# Patient Record
Sex: FEMALE | Race: WHITE | NOT HISPANIC OR LATINO | Employment: FULL TIME | URBAN - NONMETROPOLITAN AREA
[De-identification: names, ages, dates, MRNs, and addresses within clinical notes are randomized per-mention and may not be internally consistent; named-entity substitution may affect disease eponyms.]

---

## 2017-11-28 PROBLEM — K92.1 MELENA: Status: ACTIVE | Noted: 2017-11-28

## 2022-06-10 ENCOUNTER — HOSPITAL ENCOUNTER (EMERGENCY)
Facility: HOSPITAL | Age: 72
Discharge: HOME | End: 2022-06-10
Attending: STUDENT IN AN ORGANIZED HEALTH CARE EDUCATION/TRAINING PROGRAM
Payer: MEDICARE

## 2022-06-10 VITALS
OXYGEN SATURATION: 96 % | SYSTOLIC BLOOD PRESSURE: 163 MMHG | RESPIRATION RATE: 18 BRPM | DIASTOLIC BLOOD PRESSURE: 86 MMHG | BODY MASS INDEX: 29.16 KG/M2 | HEIGHT: 65 IN | TEMPERATURE: 98 F | WEIGHT: 175 LBS | HEART RATE: 63 BPM

## 2022-06-10 DIAGNOSIS — K59.00 CONSTIPATION, UNSPECIFIED CONSTIPATION TYPE: ICD-10-CM

## 2022-06-10 DIAGNOSIS — K52.9 COLITIS: Primary | ICD-10-CM

## 2022-06-10 LAB
ALBUMIN SERPL BCP-MCNC: 3.2 G/DL (ref 3.5–5.2)
ALP SERPL-CCNC: 51 U/L (ref 55–135)
ALT SERPL W/O P-5'-P-CCNC: 36 U/L (ref 10–44)
ANION GAP SERPL CALC-SCNC: 5 MMOL/L (ref 8–16)
APTT BLDCRRT: 21.1 SEC (ref 21–32)
AST SERPL-CCNC: 21 U/L (ref 10–40)
BASOPHILS # BLD AUTO: 0.05 K/UL (ref 0–0.2)
BASOPHILS NFR BLD: 0.4 % (ref 0–1.9)
BILIRUB SERPL-MCNC: 0.5 MG/DL (ref 0.1–1)
BUN SERPL-MCNC: 16 MG/DL (ref 8–23)
CALCIUM SERPL-MCNC: 8.8 MG/DL (ref 8.7–10.5)
CHLORIDE SERPL-SCNC: 105 MMOL/L (ref 95–110)
CO2 SERPL-SCNC: 29 MMOL/L (ref 23–29)
CREAT SERPL-MCNC: 1 MG/DL (ref 0.5–1.4)
DIFFERENTIAL METHOD: ABNORMAL
EOSINOPHIL # BLD AUTO: 0.2 K/UL (ref 0–0.5)
EOSINOPHIL NFR BLD: 1.7 % (ref 0–8)
ERYTHROCYTE [DISTWIDTH] IN BLOOD BY AUTOMATED COUNT: 12.1 % (ref 11.5–14.5)
EST. GFR  (AFRICAN AMERICAN): >60 ML/MIN/1.73 M^2
EST. GFR  (NON AFRICAN AMERICAN): 55.6 ML/MIN/1.73 M^2
GLUCOSE SERPL-MCNC: 120 MG/DL (ref 70–110)
HCT VFR BLD AUTO: 44.5 % (ref 37–48.5)
HGB BLD-MCNC: 15.5 G/DL (ref 12–16)
IMM GRANULOCYTES # BLD AUTO: 0.12 K/UL (ref 0–0.04)
IMM GRANULOCYTES NFR BLD AUTO: 1 % (ref 0–0.5)
INR PPP: 0.9 (ref 0.8–1.2)
LIPASE SERPL-CCNC: 94 U/L (ref 23–300)
LYMPHOCYTES # BLD AUTO: 3.4 K/UL (ref 1–4.8)
LYMPHOCYTES NFR BLD: 28.2 % (ref 18–48)
MCH RBC QN AUTO: 30.6 PG (ref 27–31)
MCHC RBC AUTO-ENTMCNC: 34.8 G/DL (ref 32–36)
MCV RBC AUTO: 88 FL (ref 82–98)
MONOCYTES # BLD AUTO: 1 K/UL (ref 0.3–1)
MONOCYTES NFR BLD: 8.4 % (ref 4–15)
NEUTROPHILS # BLD AUTO: 7.3 K/UL (ref 1.8–7.7)
NEUTROPHILS NFR BLD: 60.3 % (ref 38–73)
NRBC BLD-RTO: 0 /100 WBC
PLATELET # BLD AUTO: 263 K/UL (ref 150–450)
PMV BLD AUTO: 9.3 FL (ref 9.2–12.9)
POTASSIUM SERPL-SCNC: 3.6 MMOL/L (ref 3.5–5.1)
PROT SERPL-MCNC: 6.4 G/DL (ref 6–8.4)
PROTHROMBIN TIME: 9.9 SEC (ref 9–12.5)
RBC # BLD AUTO: 5.06 M/UL (ref 4–5.4)
SODIUM SERPL-SCNC: 139 MMOL/L (ref 136–145)
WBC # BLD AUTO: 12.06 K/UL (ref 3.9–12.7)

## 2022-06-10 PROCEDURE — 85610 PROTHROMBIN TIME: CPT | Performed by: STUDENT IN AN ORGANIZED HEALTH CARE EDUCATION/TRAINING PROGRAM

## 2022-06-10 PROCEDURE — 83690 ASSAY OF LIPASE: CPT | Performed by: STUDENT IN AN ORGANIZED HEALTH CARE EDUCATION/TRAINING PROGRAM

## 2022-06-10 PROCEDURE — 36415 COLL VENOUS BLD VENIPUNCTURE: CPT | Performed by: STUDENT IN AN ORGANIZED HEALTH CARE EDUCATION/TRAINING PROGRAM

## 2022-06-10 PROCEDURE — 85730 THROMBOPLASTIN TIME PARTIAL: CPT | Performed by: STUDENT IN AN ORGANIZED HEALTH CARE EDUCATION/TRAINING PROGRAM

## 2022-06-10 PROCEDURE — 85025 COMPLETE CBC W/AUTO DIFF WBC: CPT | Performed by: STUDENT IN AN ORGANIZED HEALTH CARE EDUCATION/TRAINING PROGRAM

## 2022-06-10 PROCEDURE — 96374 THER/PROPH/DIAG INJ IV PUSH: CPT

## 2022-06-10 PROCEDURE — 99285 EMERGENCY DEPT VISIT HI MDM: CPT | Mod: 25

## 2022-06-10 PROCEDURE — 63600175 PHARM REV CODE 636 W HCPCS: Performed by: STUDENT IN AN ORGANIZED HEALTH CARE EDUCATION/TRAINING PROGRAM

## 2022-06-10 PROCEDURE — 80053 COMPREHEN METABOLIC PANEL: CPT | Performed by: STUDENT IN AN ORGANIZED HEALTH CARE EDUCATION/TRAINING PROGRAM

## 2022-06-10 PROCEDURE — 25500020 PHARM REV CODE 255: Performed by: STUDENT IN AN ORGANIZED HEALTH CARE EDUCATION/TRAINING PROGRAM

## 2022-06-10 RX ORDER — AMOXICILLIN AND CLAVULANATE POTASSIUM 875; 125 MG/1; MG/1
1 TABLET, FILM COATED ORAL 2 TIMES DAILY
Qty: 14 TABLET | Refills: 0 | Status: SHIPPED | OUTPATIENT
Start: 2022-06-10 | End: 2022-07-21

## 2022-06-10 RX ORDER — KETOROLAC TROMETHAMINE 30 MG/ML
15 INJECTION, SOLUTION INTRAMUSCULAR; INTRAVENOUS
Status: COMPLETED | OUTPATIENT
Start: 2022-06-10 | End: 2022-06-10

## 2022-06-10 RX ORDER — POLYETHYLENE GLYCOL 3350 17 G/17G
17 POWDER, FOR SOLUTION ORAL DAILY
Qty: 116 G | Refills: 0 | Status: SHIPPED | OUTPATIENT
Start: 2022-06-10 | End: 2022-07-21

## 2022-06-10 RX ADMIN — KETOROLAC TROMETHAMINE 15 MG: 30 INJECTION, SOLUTION INTRAMUSCULAR at 05:06

## 2022-06-10 RX ADMIN — IOHEXOL 100 ML: 350 INJECTION, SOLUTION INTRAVENOUS at 05:06

## 2022-06-10 NOTE — ED PROVIDER NOTES
"Encounter Date: 6/10/2022       History     Chief Complaint   Patient presents with    Abdominal Pain     Covid + 6/3. Complains of lower abd pain x 1 week, suddenly worsening today. Complains of constipation and vomiting. "Sever sharp pains"     72-year-old female with no significant past medical history presents with lower abdominal pain for the past week which has progressively gotten worse.  Patient says the pain is episodic sharp without radiation.  Patient also mentioned that she has been constipated for over week with hard stool.  Denies any dysuria, vomiting, fever, trauma.  Patient says that she is passing        Review of patient's allergies indicates:  No Known Allergies  Past Medical History:   Diagnosis Date    Cancer     OVARIAN    Cancer     OVARIAN CANCER    Constipation     Gastric ulcer     GERD (gastroesophageal reflux disease)     High cholesterol     Hypertension     PAST TX. W/O TX @ PRESENT    Hypertension     PAST H/O , NO TX NOW    Melena     Rash     LOWER BACK    UTI (urinary tract infection)     Wears dentures     Wears glasses      Past Surgical History:   Procedure Laterality Date    CARPAL TUNNEL RELEASE Bilateral     CHOLECYSTECTOMY      COLONOSCOPY      COLONOSCOPY N/A 11/28/2017    Procedure: COLONOSCOPY;  Surgeon: Rui June MD;  Location: [facility];  Service: Endoscopy;  Laterality: N/A;    ESOPHAGOGASTRODUODENOSCOPY      HYSTERECTOMY      ORIF WRIST FRACTURE Right      Family History   Problem Relation Age of Onset    Diabetes Mother     Heart disease Father      Social History     Tobacco Use    Smoking status: Never Smoker    Smokeless tobacco: Never Used   Substance Use Topics    Alcohol use: No    Drug use: No     Review of Systems   Constitutional: Negative.    HENT: Negative.    Respiratory: Negative.    Cardiovascular: Negative.    Gastrointestinal: Positive for abdominal pain and constipation.   Genitourinary: Negative.  "   Musculoskeletal: Negative.    Skin: Negative.    Neurological: Negative.    Psychiatric/Behavioral: Negative.    All other systems reviewed and are negative.      Physical Exam     Initial Vitals   BP Pulse Resp Temp SpO2   06/10/22 1609 06/10/22 1608 06/10/22 1608 06/10/22 1608 06/10/22 1608   (!) 175/94 78 (!) 22 97.9 °F (36.6 °C) 96 %      MAP       --                Physical Exam    Nursing note and vitals reviewed.  Constitutional: Vital signs are normal. She appears well-developed and well-nourished.   HENT:   Head: Normocephalic and atraumatic.   Eyes: Conjunctivae and lids are normal.   Neck: Trachea normal. Neck supple.   Cardiovascular: Normal rate, regular rhythm, normal heart sounds, intact distal pulses and normal pulses.   No murmur heard.  Pulmonary/Chest: Breath sounds normal. No respiratory distress.   Abdominal: Abdomen is soft. Bowel sounds are normal. There is abdominal tenderness.   Tenderness to suprapubic rate There is guarding.   Musculoskeletal:         General: Normal range of motion.      Cervical back: Neck supple.     Neurological: She is alert and oriented to person, place, and time. She has normal strength. No cranial nerve deficit or sensory deficit.   Skin: Skin is warm. Capillary refill takes less than 2 seconds.   Psychiatric: She has a normal mood and affect. Her speech is normal. Thought content normal.         ED Course   Procedures  Labs Reviewed   CBC W/ AUTO DIFFERENTIAL - Abnormal; Notable for the following components:       Result Value    Immature Granulocytes 1.0 (*)     Immature Grans (Abs) 0.12 (*)     All other components within normal limits   COMPREHENSIVE METABOLIC PANEL - Abnormal; Notable for the following components:    Glucose 120 (*)     Albumin 3.2 (*)     Alkaline Phosphatase 51 (*)     Anion Gap 5 (*)     eGFR if non  55.6 (*)     All other components within normal limits   LIPASE   PROTIME-INR   APTT   URINALYSIS, REFLEX TO URINE CULTURE           Imaging Results          CT Abdomen Pelvis With Contrast (Final result)  Result time 06/10/22 18:06:44    Final result by Ada Conde MD (06/10/22 18:06:44)                 Impression:      Abnormal sigmoid colon with lobulated wall thickening small pericolonic lymph nodes and mild fat stranding.  Nonspecific colitis versus neoplasm      Electronically signed by: Ada Conde MD  Date:    06/10/2022  Time:    18:06             Narrative:    EXAMINATION:  CT ABDOMEN PELVIS WITH CONTRAST    CLINICAL HISTORY:  Abdominal pain, acute, nonlocalized;    CT/Cardiac Nuclear exams in prior 12 months: 0    TECHNIQUE:  CT abdomen and pelvis with IV contrast.  Coronal reformats prepared.  Iterative reconstruction utilized.    COMPARISON:  10/19/2016    FINDINGS:  Abnormal relative long segment of lobulated sigmoid colonic wall thickening in faint pericolonic fat stranding.  Moderate amount of stool within upstream colon.  Several small adjacent lymph nodes.  Nonspecific colitis versus neoplasm.    Small liver lesion medial margin of right hepatic lobe, unchanged likely a hemangioma.  Liver parenchyma is of low attenuation suggesting steatosis.  Kidneys, adrenals, spleen and pancreas show no significant abnormalities.  No free air.  No significant ascites.  No acute osseous abnormalities.                                 Medications   ketorolac injection 15 mg (15 mg Intravenous Given 6/10/22 1707)   iohexoL (OMNIPAQUE 350) injection 100 mL (100 mLs Intravenous Given 6/10/22 6322)     Medical Decision Making:   Initial Assessment:   72-year-old female with no significant past medical history presents with lower abdominal pain for the past week which has progressively gotten worse hypertensive otherwise stable vitals.  Abdomen none peritonitic.  Will get labs and imaging to rule out intra-abdominal process.  Review  Clinical Tests:   Lab Tests: Ordered and Reviewed  The following lab test(s) were unremarkable:  CBC, CMP and Lipase  Radiological Study: Ordered and Reviewed             ED Course as of 06/10/22 1816   Fri Tone 10, 2022   1814 Labs imaging noted.  Abdomen is remains soft non peritonitic.  Possible colitis versus neoplasm.  Patient symptoms started 1 week ago.  No weight loss.  Colonoscopy 5 years ago normal.  Will treat patient for colitis.  MiraLax to help with constipation.  Will follow-up with PCP for further evaluation.  Strict return precautions provide [HD]      ED Course User Index  [HD] Rajesh López MD             Clinical Impression:   Final diagnoses:  [K52.9] Colitis (Primary)  [K59.00] Constipation, unspecified constipation type          ED Disposition Condition    Discharge Stable        ED Prescriptions     Medication Sig Dispense Start Date End Date Auth. Provider    amoxicillin-clavulanate 875-125mg (AUGMENTIN) 875-125 mg per tablet Take 1 tablet by mouth 2 (two) times daily. 14 tablet 6/10/2022  Rajesh López MD    polyethylene glycol (GLYCOLAX) 17 gram/dose powder Take 17 g by mouth once daily. 116 g 6/10/2022  Rajesh López MD        Follow-up Information     Follow up With Specialties Details Why Contact Info    Iris Benedict NP Nurse Practitioner In 2 days  [address] [facility] [address]  [phone]             Rajesh López MD  06/10/22 1816

## 2022-06-29 ENCOUNTER — TELEPHONE (OUTPATIENT)
Dept: SURGERY | Facility: CLINIC | Age: 72
End: 2022-06-29
Payer: MEDICARE

## 2022-06-29 NOTE — TELEPHONE ENCOUNTER
Called patient to confirm appointment. Patient did not answer a voicemail was left for the patient.

## 2022-06-30 ENCOUNTER — OFFICE VISIT (OUTPATIENT)
Dept: SURGERY | Facility: CLINIC | Age: 72
End: 2022-06-30
Payer: MEDICARE

## 2022-06-30 VITALS — HEIGHT: 65 IN | HEART RATE: 70 BPM | WEIGHT: 180.75 LBS | BODY MASS INDEX: 30.12 KG/M2 | OXYGEN SATURATION: 98 %

## 2022-06-30 DIAGNOSIS — Z12.11 SCREEN FOR COLON CANCER: Primary | ICD-10-CM

## 2022-06-30 DIAGNOSIS — Z01.818 PRE-OP TESTING: ICD-10-CM

## 2022-06-30 PROCEDURE — 99202 OFFICE O/P NEW SF 15 MIN: CPT | Mod: S$GLB,,, | Performed by: STUDENT IN AN ORGANIZED HEALTH CARE EDUCATION/TRAINING PROGRAM

## 2022-06-30 PROCEDURE — 3008F BODY MASS INDEX DOCD: CPT | Mod: CPTII,S$GLB,, | Performed by: STUDENT IN AN ORGANIZED HEALTH CARE EDUCATION/TRAINING PROGRAM

## 2022-06-30 PROCEDURE — 1126F PR PAIN SEVERITY QUANTIFIED, NO PAIN PRESENT: ICD-10-PCS | Mod: CPTII,S$GLB,, | Performed by: STUDENT IN AN ORGANIZED HEALTH CARE EDUCATION/TRAINING PROGRAM

## 2022-06-30 PROCEDURE — 1159F PR MEDICATION LIST DOCUMENTED IN MEDICAL RECORD: ICD-10-PCS | Mod: CPTII,S$GLB,, | Performed by: STUDENT IN AN ORGANIZED HEALTH CARE EDUCATION/TRAINING PROGRAM

## 2022-06-30 PROCEDURE — 1126F AMNT PAIN NOTED NONE PRSNT: CPT | Mod: CPTII,S$GLB,, | Performed by: STUDENT IN AN ORGANIZED HEALTH CARE EDUCATION/TRAINING PROGRAM

## 2022-06-30 PROCEDURE — 1159F MED LIST DOCD IN RCRD: CPT | Mod: CPTII,S$GLB,, | Performed by: STUDENT IN AN ORGANIZED HEALTH CARE EDUCATION/TRAINING PROGRAM

## 2022-06-30 PROCEDURE — 99999 PR PBB SHADOW E&M-EST. PATIENT-LVL V: ICD-10-PCS | Mod: PBBFAC,,, | Performed by: STUDENT IN AN ORGANIZED HEALTH CARE EDUCATION/TRAINING PROGRAM

## 2022-06-30 PROCEDURE — 99999 PR PBB SHADOW E&M-EST. PATIENT-LVL V: CPT | Mod: PBBFAC,,, | Performed by: STUDENT IN AN ORGANIZED HEALTH CARE EDUCATION/TRAINING PROGRAM

## 2022-06-30 PROCEDURE — 3008F PR BODY MASS INDEX (BMI) DOCUMENTED: ICD-10-PCS | Mod: CPTII,S$GLB,, | Performed by: STUDENT IN AN ORGANIZED HEALTH CARE EDUCATION/TRAINING PROGRAM

## 2022-06-30 PROCEDURE — 99202 PR OFFICE/OUTPT VISIT, NEW, LEVL II, 15-29 MIN: ICD-10-PCS | Mod: S$GLB,,, | Performed by: STUDENT IN AN ORGANIZED HEALTH CARE EDUCATION/TRAINING PROGRAM

## 2022-06-30 RX ORDER — SOD SULF/POT CHLORIDE/MAG SULF 1.479 G
12 TABLET ORAL 2 TIMES DAILY
Qty: 24 TABLET | Refills: 0 | Status: SHIPPED | OUTPATIENT
Start: 2022-06-30 | End: 2022-07-12

## 2022-06-30 NOTE — PATIENT INSTRUCTIONS
You are scheduled for a colonoscopy with Dr. Choi on 7/27/2022    Take  bowel prep on 7/26/2022(the day before your procedure).  Take 12 tablets at 4pm and 12 tablets at 10pm the day before the procedure. Pills need to be taken with adequate amounts of water indicated on the package.      Eat a clear liquid diet on 7/26/2022.  Avoid meats, beans, corn, okra, seed-containing fruits, and red liquids    The following foods are generally allowed in a clear liquid diet:    Water (plain, carbonated or flavored)  Fruit juices without pulp, such as apple or white grape juice  Fruit-flavored beverages, such as fruit punch or lemonade  Carbonated drinks, including dark sodas (cola and root beer)  Gelatin  Tea or coffee without milk or cream  Strained tomato or vegetable juice  Sports drinks  Clear, fat-free broth (bouillon or consomme)  Honey or sugar  Hard candy, such as lemon drops or peppermint rounds  Ice pops without milk, bits of fruit, seeds or nuts    Nothing to eat or drink after midnight on 7/27/2022 except for sips of water with medications    Please have someone drive you to and from your procedure

## 2022-07-01 RX ORDER — SODIUM CHLORIDE 0.9 % (FLUSH) 0.9 %
10 SYRINGE (ML) INJECTION
Status: CANCELLED | OUTPATIENT
Start: 2022-07-01

## 2022-07-01 RX ORDER — SODIUM CHLORIDE 9 MG/ML
INJECTION, SOLUTION INTRAVENOUS CONTINUOUS
Status: CANCELLED | OUTPATIENT
Start: 2022-07-01

## 2022-07-12 RX ORDER — SOD SULF/POT CHLORIDE/MAG SULF 1.479 G
12 TABLET ORAL 2 TIMES DAILY
Qty: 24 TABLET | Refills: 0 | Status: SHIPPED | OUTPATIENT
Start: 2022-07-12

## 2022-07-12 RX ORDER — SOD SULF/POT CHLORIDE/MAG SULF 1.479 G
12 TABLET ORAL 2 TIMES DAILY
Qty: 24 TABLET | Refills: 0 | Status: SHIPPED | OUTPATIENT
Start: 2022-07-12 | End: 2022-07-12 | Stop reason: ALTCHOICE

## 2022-07-18 PROBLEM — Z12.11 SCREEN FOR COLON CANCER: Status: ACTIVE | Noted: 2022-07-18

## 2022-07-18 NOTE — H&P (VIEW-ONLY)
"[facility] H&P      Consult: Screening colonoscopy   Consulting Service: [facility]       HPI: Pt is a 72 y.o.   female with PMH sig for ovarian cancer, GERD, and diverticulosis who presents for routine screening colonoscopy. Last scope in 2017 which was unremarkable save diverticulosis yet prep was poor.   BE negative for significant neoplasm.  Recommended repeat in 5 years.   No personal or family history of colon cancer.  Has daily regular BMs with daily miralax.  Denies melena, rectal bleeding or pain, change in bowel habits or unintended weight loss.  Denies CP, SOB, abdominal pain, nausea, vomiting, fevers, or chills.        PMH:   Past Medical History:   Diagnosis Date    Cancer     OVARIAN    Cancer     OVARIAN CANCER    Constipation     Gastric ulcer     GERD (gastroesophageal reflux disease)     High cholesterol     Hypertension     PAST TX. W/O TX @ PRESENT    Hypertension     PAST H/O , NO TX NOW    Melena     Rash     LOWER BACK    UTI (urinary tract infection)     Wears dentures     Wears glasses        PSH:   Past Surgical History:   Procedure Laterality Date    CARPAL TUNNEL RELEASE Bilateral     CHOLECYSTECTOMY      COLONOSCOPY      COLONOSCOPY N/A 11/28/2017    Procedure: COLONOSCOPY;  Surgeon: Rui June MD;  Location: [facility];  Service: Endoscopy;  Laterality: N/A;    ESOPHAGOGASTRODUODENOSCOPY      HYSTERECTOMY      ORIF WRIST FRACTURE Right        Meds: See medication list;  No anticoagulation    ALL:   Review of patient's allergies indicates:   Allergen Reactions    Percodan [oxycodone-aspirin]        FHX: non contributory    SOC: Denies ANGELICA    ROS: As per HPI    Physical Exam:  Pulse 70   Ht 5' 5" (1.651 m)   Wt 82 kg (180 lb 12.4 oz)   SpO2 98%   BMI 30.08 kg/m²   GEN: NAD, A/Ox4  HEENT: Anicteric sclera, EOMI  Neck: Supple, no LAD  CV: RRR  Pulm: CTAB; breaths equal and nonlabored  ABD: Soft, NTTP, ND  Ext: no c/c/e      A/P: Pt " is a 72 y.o.   female who presents for routine screening colonoscopy  --To [facility] on 7/27/2022 for colonoscopy  --Procedure in detail explained to patient;  including risks including but not limited to bleeding, infection, damage to surrounding structures, and perforation- patient voiced understanding  --CBC, CMP, CXR, EKG, COVID  --Bowel prep given - Sutab  --CLD day before procedure  --OK to continue ASA           Fariha Choi MD  [phone]

## 2022-07-18 NOTE — H&P
"[facility] H&P      Consult: Screening colonoscopy   Consulting Service: [facility]       HPI: Pt is a 72 y.o.   female with PMH sig for ovarian cancer, GERD, and diverticulosis who presents for routine screening colonoscopy. Last scope in 2017 which was unremarkable save diverticulosis yet prep was poor.   BE negative for significant neoplasm.  Recommended repeat in 5 years.   No personal or family history of colon cancer.  Has daily regular BMs with daily miralax.  Denies melena, rectal bleeding or pain, change in bowel habits or unintended weight loss.  Denies CP, SOB, abdominal pain, nausea, vomiting, fevers, or chills.        PMH:   Past Medical History:   Diagnosis Date    Cancer     OVARIAN    Cancer     OVARIAN CANCER    Constipation     Gastric ulcer     GERD (gastroesophageal reflux disease)     High cholesterol     Hypertension     PAST TX. W/O TX @ PRESENT    Hypertension     PAST H/O , NO TX NOW    Melena     Rash     LOWER BACK    UTI (urinary tract infection)     Wears dentures     Wears glasses        PSH:   Past Surgical History:   Procedure Laterality Date    CARPAL TUNNEL RELEASE Bilateral     CHOLECYSTECTOMY      COLONOSCOPY      COLONOSCOPY N/A 11/28/2017    Procedure: COLONOSCOPY;  Surgeon: uRi June MD;  Location: [facility];  Service: Endoscopy;  Laterality: N/A;    ESOPHAGOGASTRODUODENOSCOPY      HYSTERECTOMY      ORIF WRIST FRACTURE Right        Meds: See medication list;  No anticoagulation    ALL:   Review of patient's allergies indicates:   Allergen Reactions    Percodan [oxycodone-aspirin]        FHX: non contributory    SOC: Denies ANGELICA    ROS: As per HPI    Physical Exam:  Pulse 70   Ht 5' 5" (1.651 m)   Wt 82 kg (180 lb 12.4 oz)   SpO2 98%   BMI 30.08 kg/m²   GEN: NAD, A/Ox4  HEENT: Anicteric sclera, EOMI  Neck: Supple, no LAD  CV: RRR  Pulm: CTAB; breaths equal and nonlabored  ABD: Soft, NTTP, ND  Ext: no c/c/e      A/P: Pt " is a 72 y.o.   female who presents for routine screening colonoscopy  --To [facility] on 7/27/2022 for colonoscopy  --Procedure in detail explained to patient;  including risks including but not limited to bleeding, infection, damage to surrounding structures, and perforation- patient voiced understanding  --CBC, CMP, CXR, EKG, COVID  --Bowel prep given - Sutab  --CLD day before procedure  --OK to continue ASA           Fariha Choi MD  [phone]

## 2022-07-20 NOTE — DISCHARGE INSTRUCTIONS
BEFORE THE PROCEDURE:    REPORT ANY CHANGE IN YOUR PHYSICAL CONDITION TO YOUR DOCTOR IMMEDIATELY.  SELF ISOLATE AND CHECK TEMPERATURE DAILY, IF TEMP OVER 100, CALL PHYSICIAN IMMEDIATELY.  TRY TO REFRAIN FROM SMOKING AND ALCOHOL 72 HOURS BEFORE YOUR PROCEDURE.   DO NOT EAT OR DRINK ANYTHING AFTER MIDNIGHT THE NIGHT BEFORE YOUR PROCEDURE.  NO MAKE UP, NAIL POLISH OR JEWELRY.      SOMEONE WILL CALL YOU THE DAY BEFORE YOUR PROCEDURE WITH A CHECK-IN TIME FOR YOUR PROCEDURE.    DAY OF YOUR PROCEDURE:    TAKE BLOOD PRESSURE MEDICATIONS THE MORNING OF YOUR PROCEDURE, WITH SMALL SIPS WATER, AS DIRECTED BY YOUR PHYSICIAN.   DO NOT TAKE ANY DIABETIC MEDICATIONS UNLESS DIRECTED TO DO SO BY YOUR PHYSICIAN.   CONTACT LENSES AND DENTURES MUST BE REMOVED.  A RESPONSIBLE ADULT MUST ACCOMPANY YOU HOME UPON DISCHARGE.   ONLY 1 VISITOR ALLOWED PER ROOM.     COVID POSITIVE 06/2022    MASKS ARE OPTIONAL.    YOUR THOUGHTS AND OPINIONS HELP US TO BETTER SERVE YOU.     PLEASE PARTICIPATE IN SURVEYS ABOUT YOUR CARE.    THANK YOU FOR CHOOSING [facility].

## 2022-07-21 ENCOUNTER — HOSPITAL ENCOUNTER (OUTPATIENT)
Dept: RADIOLOGY | Facility: HOSPITAL | Age: 72
Discharge: HOME | End: 2022-07-21
Attending: STUDENT IN AN ORGANIZED HEALTH CARE EDUCATION/TRAINING PROGRAM
Payer: MEDICARE

## 2022-07-21 ENCOUNTER — HOSPITAL ENCOUNTER (OUTPATIENT)
Dept: PREADMISSION TESTING | Facility: HOSPITAL | Age: 72
Discharge: HOME | End: 2022-07-21
Attending: STUDENT IN AN ORGANIZED HEALTH CARE EDUCATION/TRAINING PROGRAM
Payer: MEDICARE

## 2022-07-21 VITALS — HEIGHT: 65 IN | WEIGHT: 170 LBS | BODY MASS INDEX: 28.32 KG/M2

## 2022-07-21 DIAGNOSIS — Z12.11 SCREEN FOR COLON CANCER: ICD-10-CM

## 2022-07-21 DIAGNOSIS — Z01.818 PRE-OP TESTING: ICD-10-CM

## 2022-07-21 PROCEDURE — 71045 X-RAY EXAM CHEST 1 VIEW: CPT | Mod: TC

## 2022-07-25 ENCOUNTER — ANESTHESIA EVENT (OUTPATIENT)
Dept: ENDOSCOPY | Facility: HOSPITAL | Age: 72
End: 2022-07-25
Payer: MEDICARE

## 2022-07-25 NOTE — ANESTHESIA PREPROCEDURE EVALUATION
07/25/2022  Mehrdad Echevarria is a 72 y.o., female.      Pre-op Assessment    I have reviewed the Patient Summary Reports.    I have reviewed the NPO Status.   I have reviewed the Medications.     Review of Systems  Anesthesia Hx:  No problems with previous Anesthesia  Denies Family Hx of Anesthesia complications.   Denies Personal Hx of Anesthesia complications.   Social:  Non-Smoker    Cardiovascular:   Hypertension PVD CIS CLEARANCE   Pulmonary:  Pulmonary Normal HX COVID   Renal/:  Renal/ Normal     Hepatic/GI:   PUD, GERD, well controlled    Neurological:  Neurology Normal    Endocrine:  Endocrine Normal      Lab Results   Component Value Date    WBC 12.06 06/10/2022    HGB 15.5 06/10/2022    HCT 44.5 06/10/2022    MCV 88 06/10/2022     06/10/2022     CMP  Sodium   Date Value Ref Range Status   06/10/2022 139 136 - 145 mmol/L Final     Potassium   Date Value Ref Range Status   06/10/2022 3.6 3.5 - 5.1 mmol/L Final     Chloride   Date Value Ref Range Status   06/10/2022 105 95 - 110 mmol/L Final     CO2   Date Value Ref Range Status   06/10/2022 29 23 - 29 mmol/L Final     Glucose   Date Value Ref Range Status   06/10/2022 120 (H) 70 - 110 mg/dL Final     BUN   Date Value Ref Range Status   06/10/2022 16 8 - 23 mg/dL Final     Creatinine   Date Value Ref Range Status   06/10/2022 1.0 0.5 - 1.4 mg/dL Final     Calcium   Date Value Ref Range Status   06/10/2022 8.8 8.7 - 10.5 mg/dL Final     Total Protein   Date Value Ref Range Status   06/10/2022 6.4 6.0 - 8.4 g/dL Final     Albumin   Date Value Ref Range Status   06/10/2022 3.2 (L) 3.5 - 5.2 g/dL Final     Total Bilirubin   Date Value Ref Range Status   06/10/2022 0.5 0.1 - 1.0 mg/dL Final     Comment:     For infants and newborns, interpretation of results should be based  on gestational age, weight and in agreement with  clinical  observations.    Premature Infant recommended reference ranges:  Up to 24 hours.............<8.0 mg/dL  Up to 48 hours............<12.0 mg/dL  3-5 days..................<15.0 mg/dL  6-29 days.................<15.0 mg/dL    For patients on Eltrombopag therapy, use of Dimension [place] TBIL is   not   recommended.       Alkaline Phosphatase   Date Value Ref Range Status   06/10/2022 51 (L) 55 - 135 U/L Final     AST   Date Value Ref Range Status   06/10/2022 21 10 - 40 U/L Final     ALT   Date Value Ref Range Status   06/10/2022 36 10 - 44 U/L Final     Anion Gap   Date Value Ref Range Status   06/10/2022 5 (L) 8 - 16 mmol/L Final     eGFR if    Date Value Ref Range Status   06/10/2022 >60.0 >60 mL/min/1.73 m^2 Final     eGFR if non    Date Value Ref Range Status   06/10/2022 55.6 (A) >60 mL/min/1.73 m^2 Final     Comment:     Calculation used to obtain the estimated glomerular filtration  rate (eGFR) is the CKD-EPI equation.          Physical Exam  General: Well nourished    Airway:  Mallampati: II / I  Mouth Opening: Normal  TM Distance: Normal  Tongue: Normal  Neck ROM: Normal ROM    Dental:  Dentures    Chest/Lungs:  Clear to auscultation    Heart:  Rate: Normal  Rhythm: Regular Rhythm  Sounds: Normal        Anesthesia Plan  Type of Anesthesia, risks & benefits discussed:    Anesthesia Type: MAC  Intra-op Monitoring Plan: Standard ASA Monitors  Post Op Pain Control Plan: multimodal analgesia  Induction:  IV  Airway Plan: Direct  Informed Consent: Informed consent signed with the Patient and all parties understand the risks and agree with anesthesia plan.  All questions answered.   ASA Score: 3  Day of Surgery Review of History & Physical: I have interviewed and examined the patient. I have reviewed the patient's H&P dated: There are no significant changes.     Ready For Surgery From Anesthesia Perspective.     .

## 2022-07-26 ENCOUNTER — TELEPHONE (OUTPATIENT)
Dept: SURGERY | Facility: CLINIC | Age: 72
End: 2022-07-26
Payer: MEDICARE

## 2022-07-26 NOTE — TELEPHONE ENCOUNTER
Called patient to remind her of a clear liquid diet. Patient phone went to voicemail and a message was left for the patient to give me a return call

## 2022-07-27 ENCOUNTER — HOSPITAL ENCOUNTER (OUTPATIENT)
Facility: HOSPITAL | Age: 72
Discharge: HOME | End: 2022-07-27
Attending: STUDENT IN AN ORGANIZED HEALTH CARE EDUCATION/TRAINING PROGRAM | Admitting: STUDENT IN AN ORGANIZED HEALTH CARE EDUCATION/TRAINING PROGRAM
Payer: MEDICARE

## 2022-07-27 ENCOUNTER — ANESTHESIA (OUTPATIENT)
Dept: ENDOSCOPY | Facility: HOSPITAL | Age: 72
End: 2022-07-27
Payer: MEDICARE

## 2022-07-27 DIAGNOSIS — Z12.11 SCREEN FOR COLON CANCER: Primary | ICD-10-CM

## 2022-07-27 PROCEDURE — G0121 COLON CA SCRN NOT HI RSK IND: HCPCS | Mod: ,,, | Performed by: STUDENT IN AN ORGANIZED HEALTH CARE EDUCATION/TRAINING PROGRAM

## 2022-07-27 PROCEDURE — 25000003 PHARM REV CODE 250: Performed by: STUDENT IN AN ORGANIZED HEALTH CARE EDUCATION/TRAINING PROGRAM

## 2022-07-27 PROCEDURE — G0121 COLON CA SCRN NOT HI RSK IND: HCPCS | Performed by: STUDENT IN AN ORGANIZED HEALTH CARE EDUCATION/TRAINING PROGRAM

## 2022-07-27 PROCEDURE — 37000009 HC ANESTHESIA EA ADD 15 MINS: Performed by: STUDENT IN AN ORGANIZED HEALTH CARE EDUCATION/TRAINING PROGRAM

## 2022-07-27 PROCEDURE — 37000008 HC ANESTHESIA 1ST 15 MINUTES: Performed by: STUDENT IN AN ORGANIZED HEALTH CARE EDUCATION/TRAINING PROGRAM

## 2022-07-27 PROCEDURE — G0121 COLON CA SCRN NOT HI RSK IND: ICD-10-PCS | Mod: ,,, | Performed by: STUDENT IN AN ORGANIZED HEALTH CARE EDUCATION/TRAINING PROGRAM

## 2022-07-27 RX ORDER — NYSTATIN 100000 U/G
CREAM TOPICAL 2 TIMES DAILY
Qty: 15 G | Refills: 1 | Status: SHIPPED | OUTPATIENT
Start: 2022-07-27

## 2022-07-27 RX ORDER — PROPOFOL 10 MG/ML
VIAL (ML) INTRAVENOUS
Status: DISCONTINUED | OUTPATIENT
Start: 2022-07-27 | End: 2022-07-27

## 2022-07-27 RX ORDER — SODIUM CHLORIDE 9 MG/ML
INJECTION, SOLUTION INTRAVENOUS CONTINUOUS
Status: DISCONTINUED | OUTPATIENT
Start: 2022-07-27 | End: 2022-07-27 | Stop reason: HOSPADM

## 2022-07-27 RX ORDER — SODIUM CHLORIDE 0.9 % (FLUSH) 0.9 %
10 SYRINGE (ML) INJECTION
Status: DISCONTINUED | OUTPATIENT
Start: 2022-07-27 | End: 2022-07-27 | Stop reason: HOSPADM

## 2022-07-27 RX ADMIN — Medication 50 MG: at 09:07

## 2022-07-27 RX ADMIN — Medication 100 MG: at 09:07

## 2022-07-27 RX ADMIN — Medication 50 MG: at 10:07

## 2022-07-27 RX ADMIN — SODIUM CHLORIDE: 0.9 INJECTION, SOLUTION INTRAVENOUS at 09:07

## 2022-07-27 NOTE — INTERVAL H&P NOTE
"Patient seen and examined.  No interval change since the below obtained H&P  Informed consent verified    NPO since midnight  Prep taken; last BM liquid; "tea colored"  All questions and concerns addressed  To Endo for screening colonoscopy    Fariha Choi MD  General Surgery   [phone]    "

## 2022-07-27 NOTE — TRANSFER OF CARE
Anesthesia Transfer of Care Note    Patient: Adlena May Echevarria    Procedure(s) Performed: Procedure(s) (LRB):  COLONOSCOPY (N/A)    Patient location: GI    Anesthesia Type: MAC    Transport from OR: Transported from OR on room air with adequate spontaneous ventilation    Post pain: adequate analgesia    Post assessment: no apparent anesthetic complications    Post vital signs: stable    Level of consciousness: awake    Nausea/Vomiting: no nausea/vomiting    Complications: none    Transfer of care protocol was followed      Last vitals:   112/60  16 RR  55 HR  96% O2 SAT

## 2022-07-27 NOTE — ANESTHESIA POSTPROCEDURE EVALUATION
Anesthesia Post Evaluation    Patient: Mehrdad Echevarria    Procedure(s) Performed: Procedure(s) (LRB):  COLONOSCOPY (N/A)    Final Anesthesia Type: MAC      Patient location during evaluation: OPS  Patient participation: Yes- Able to Participate  Level of consciousness: awake  Post-procedure vital signs: reviewed and stable  Pain management: adequate  Airway patency: patent    PONV status at discharge: No PONV  Anesthetic complications: no      Cardiovascular status: blood pressure returned to baseline  Respiratory status: spontaneous ventilation  Hydration status: euvolemic  Follow-up not needed.          Vitals Value Taken Time   /75 07/27/22 1029   Temp 35.8 °C (96.5 °F) 07/27/22 1029   Pulse 52 07/27/22 1029   Resp 18 07/27/22 1029   SpO2 95 % 07/27/22 1029         No case tracking events are documented in the log.      Pain/Melida Score: Melida Score: 9 (7/27/2022 10:29 AM)

## 2022-07-27 NOTE — DISCHARGE INSTRUCTIONS
FOLLOW UP WITH DR OSULLIVAN AS SCHEDULED.    NO DRIVING OR DRINKING ALCOHOL FOR 24 HOURS.    CALL DR OSULLIVAN'S OFFICE FOR ANY QUESTIONS OR CONCERNS.  REPORT TO THE ER IF URGENT.    THANK YOU FOR CHOOSING [facility]!

## 2022-07-29 ENCOUNTER — HOSPITAL ENCOUNTER (EMERGENCY)
Facility: HOSPITAL | Age: 72
Discharge: HOME | End: 2022-07-29
Attending: EMERGENCY MEDICINE
Payer: MEDICARE

## 2022-07-29 VITALS
SYSTOLIC BLOOD PRESSURE: 168 MMHG | OXYGEN SATURATION: 96 % | RESPIRATION RATE: 18 BRPM | TEMPERATURE: 98 F | DIASTOLIC BLOOD PRESSURE: 77 MMHG | HEART RATE: 58 BPM

## 2022-07-29 VITALS
DIASTOLIC BLOOD PRESSURE: 75 MMHG | HEART RATE: 52 BPM | SYSTOLIC BLOOD PRESSURE: 136 MMHG | RESPIRATION RATE: 18 BRPM | TEMPERATURE: 97 F | OXYGEN SATURATION: 95 %

## 2022-07-29 DIAGNOSIS — S20.212A CONTUSION OF RIB ON LEFT SIDE, INITIAL ENCOUNTER: Primary | ICD-10-CM
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
## 2022-07-29 PROCEDURE — 99284 EMERGENCY DEPT VISIT MOD MDM: CPT | Mod: 25

## 2022-07-29 PROCEDURE — 25000003 PHARM REV CODE 250: Performed by: CLINICAL NURSE SPECIALIST

## 2022-07-29 RX ORDER — HYDROCODONE BITARTRATE AND ACETAMINOPHEN 10; 325 MG/1; MG/1
1 TABLET ORAL ONCE
Status: COMPLETED | OUTPATIENT
Start: 2022-07-29 | End: 2022-07-29

## 2022-07-29 RX ORDER — HYDROCODONE BITARTRATE AND ACETAMINOPHEN 5; 325 MG/1; MG/1
1 TABLET ORAL EVERY 4 HOURS PRN
Qty: 8 TABLET | Refills: 0 | Status: SHIPPED | OUTPATIENT
Start: 2022-07-29

## 2022-07-29 RX ORDER — METHOCARBAMOL 500 MG/1
1000 TABLET, FILM COATED ORAL 3 TIMES DAILY
Qty: 30 TABLET | Refills: 0 | Status: SHIPPED | OUTPATIENT
Start: 2022-07-29 | End: 2022-08-03

## 2022-07-29 RX ADMIN — HYDROCODONE BITARTRATE AND ACETAMINOPHEN 1 TABLET: 10; 325 TABLET ORAL at 05:07

## 2022-07-29 NOTE — DISCHARGE SUMMARY
[place] - Endoscopy  Discharge Note  Short Stay    Procedure(s) (LRB):  COLONOSCOPY (N/A)    OUTCOME: Patient tolerated treatment/procedure well without complication and is now ready for discharge.    DISPOSITION: Home or Self Care    FINAL DIAGNOSIS:  Screen for colon cancer    FOLLOWUP: In clinic    DISCHARGE INSTRUCTIONS:    Discharge Procedure Orders   Diet Adult Regular     No driving until:     Notify your health care provider if you experience any of the following:  temperature >100.4     Notify your health care provider if you experience any of the following:  persistent nausea and vomiting or diarrhea     Notify your health care provider if you experience any of the following:  severe uncontrolled pain     Activity as tolerated         Clinical Reference Documents Added to Patient Instructions       Document    DIVERTICULOSIS DISCHARGE INSTRUCTIONS (ENGLISH)          TIME SPENT ON DISCHARGE: 5 minutes

## 2022-07-29 NOTE — OP NOTE
[facility] - OR Periop Services  General Surgery Department  Operative Note    SUMMARY     Date of Procedure: 7/27/2022    Procedure: Procedure(s) (LRB):  COLONOSCOPY:      Surgeon(s) and Role:  Fariha Choi MD     Pre-Operative Diagnosis: Pre-Op Diagnosis Codes:     * Screen for colon cancer [Z12.11]    Post-Operative Diagnosis: Same         Anesthesia: Local MAC      Findings:  Severe pan-colonic diverticular disease   No masses or polypoid lesions seen.    There was normal mucosa with normal submucosal venous pattern.    No vascular lesions were identified.         Indications for the Procedure:  73yo female who presents for routine screening colonoscopy.     Operative Conduct in Detail:   The risks, benefits, and alternatives were thoroughly discussed with the patient. Despite the risks, the patient wished to proceed. Informed consent was obtained and it will scanned to the electronic chart.      The patient was placed on left lateral decubitus. After achieving moderate sedation, a digital rectal examination was performed; subsequently, a standard colonoscope was introduced through the anus and advanced with difficulty up to the maximum extent in the mid ascending colon. The scope was withdrawn slowly while the mucosa was carefully examined. The following findings were seen:    1. Bowel Preparation: poor - scattered balls of stool with residual liquid stool   2. Rectum: Grade I internal hemorrhoids  3. Sigmoid: cavernous diverticula; tortuous   4. Descending: scattered diverticula  5. Transverse: normal  6. Ascending: mild diverticular disease  7. Cecum: unable to visualize    Withdrawal time >6 minutes (if no biopsies/polypectomies obtained)      Complications: No    Estimated Blood Loss (EBL): None             Specimens:   · None           Condition: Good    Disposition: PACU - hemodynamically stable.    Recommendation:  MBE as outpatient; Repeat colonoscopy in 5 years if negative    Fariha  MD Steph  General Surgery   [phone]

## 2022-07-29 NOTE — ED PROVIDER NOTES
Encounter Date: 7/29/2022       History     Chief Complaint   Patient presents with    Fall     I had a fall a week ago and im badly bruised on my chest.  This morning when I was moving I heard a pop in my chest.  I can hardly take a deep breath.      Mehrdad Echevarria is an 72 y.o. female who complains of midsternal chest pain after fall approximately 1 week ago.  Patient reports that she tripped and fell hitting a metal swing with her chest a week ago.  Reports hard to take a deep breath.  O2 level 94% on room air.  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 W/O TX @ PRESENT    Hypertension     PAST H/O , NO TX NOW    Melena     Rash     LOWER BACK    UTI (urinary tract infection)     Wears dentures     FULL    Wears glasses      Past Surgical History:   Procedure Laterality Date    CARPAL TUNNEL RELEASE Bilateral     CHOLECYSTECTOMY      COLONOSCOPY      COLONOSCOPY N/A 11/28/2017    Procedure: COLONOSCOPY;  Surgeon: Rui June MD;  Location: [facility];  Service: Endoscopy;  Laterality: N/A;    COLONOSCOPY N/A 7/27/2022    Procedure: COLONOSCOPY;  Surgeon: Fariha Choi MD;  Location: [facility];  Service: General;  Laterality: N/A;  4th 0700    ESOPHAGOGASTRODUODENOSCOPY      HYSTERECTOMY      ORIF WRIST FRACTURE Right      Family History   Problem Relation Age of Onset    Diabetes Mother 89    Heart disease Father 69    Suicide Sister 62    Pancreatic cancer Brother 70     Social History      Tobacco Use    Smoking status: Never Smoker    Smokeless tobacco: Never Used   Substance Use Topics    Alcohol use: No    Drug use: No     Review of Systems   Constitutional: Negative for fever.   HENT: Negative for sore throat.    Respiratory: Positive for chest tightness and shortness of breath.    Cardiovascular: Positive for chest pain.   Gastrointestinal: Negative for nausea.   Genitourinary: Negative for dysuria.   Musculoskeletal: Negative for back pain.   Skin: Negative for rash.   Neurological: Negative for weakness.   Hematological: Does not bruise/bleed easily.   All other systems reviewed and are negative.      Physical Exam     Initial Vitals [07/29/22 1720]   BP Pulse Resp Temp SpO2   (!) 175/93 64 (!) 24 98.4 °F (36.9 °C) (!) 94 %      MAP       --         Physical Exam    Nursing note and vitals reviewed.  Constitutional: She appears well-developed and well-nourished.   HENT:   Head: Normocephalic and atraumatic.   Eyes: Pupils are equal, round, and reactive to light.   Neck:   Normal range of motion.  Cardiovascular: Normal rate and regular rhythm.   Pulmonary/Chest: She has rhonchi.   Abdominal: Abdomen is soft. Bowel sounds are normal.   Musculoskeletal:         General: Normal range of motion.      Cervical back: Normal range of motion.     Neurological: She is alert and oriented to person, place, and time.   Skin:   Bruising with yellowing noted to left chest wall over left breast.  Tender on palpation.   Psychiatric: She has a normal mood and affect.         ED Course   Procedures  Labs Reviewed - No data to display       Imaging Results          X-Ray Chest AP Portable (Final result)  Result time 07/29/22 17:48:44    Final result by Ada Conde MD (07/29/22 17:48:44)                 Impression:      No acute finding status post fall      Electronically signed by: Ada Conde MD  Date:    07/29/2022  Time:    17:48             Narrative:    EXAMINATION:  XR CHEST AP  PORTABLE    CLINICAL HISTORY:  Unspecified fall, initial encounter    COMPARISON:  07/21/2022    FINDINGS:  No acute infiltrates, pneumothorax or pleural effusion detected.  No change from prior.    Normal size cardiac silhouette.  Mild scoliosis.                                 Medications   HYDROcodone-acetaminophen  mg per tablet 1 tablet (1 tablet Oral Given 7/29/22 1739)     Medical Decision Making:   Differential Diagnosis:   Left rib fracture, rib contusion  Clinical Tests:   Radiological Study: Ordered and Reviewed                      Clinical Impression:   Final diagnoses:  [W19.XXXA] Fall  [S20.212A] Contusion of rib on left side, initial encounter (Primary)          ED Disposition Condition    Discharge Stable        ED Prescriptions     Medication Sig Dispense Start Date End Date Auth. Provider    HYDROcodone-acetaminophen (NORCO) 5-325 mg per tablet Take 1 tablet by mouth every 4 (four) hours as needed for Pain. 8 tablet 7/29/2022  Cathy Martini NP    methocarbamoL (ROBAXIN) 500 MG Tab Take 2 tablets (1,000 mg total) by mouth 3 (three) times daily. for 5 days 30 tablet 7/29/2022 8/3/2022 Cathy Martini NP        Follow-up Information     Follow up With Specialties Details Why Contact Info    Cha Reyes MD Internal Medicine  As needed [address] [facility]  [address] [place] [id number]  [phone]             Cathy Martini NP  07/29/22 0330

## 2022-08-10 ENCOUNTER — TELEPHONE (OUTPATIENT)
Dept: SURGERY | Facility: CLINIC | Age: 72
End: 2022-08-10
Payer: MEDICARE

## 2022-08-10 NOTE — TELEPHONE ENCOUNTER
Called to confirm appt with patient on tomorrow. Patient did not answer so a voicemail was left for the patient

## 2023-09-05 ENCOUNTER — APPOINTMENT (OUTPATIENT)
Dept: LAB | Facility: HOSPITAL | Age: 73
End: 2023-09-05
Payer: MEDICARE

## 2023-09-05 DIAGNOSIS — R31.21 ASYMPTOMATIC MICROSCOPIC HEMATURIA: Primary | ICD-10-CM

## 2023-09-05 LAB
BILIRUB UR QL STRIP: NEGATIVE
CLARITY UR: CLEAR
COLOR UR: YELLOW
GLUCOSE UR QL STRIP: NEGATIVE
HGB UR QL STRIP: ABNORMAL
KETONES UR QL STRIP: NEGATIVE
LEUKOCYTE ESTERASE UR QL STRIP: NEGATIVE
NITRITE UR QL STRIP: NEGATIVE
PH UR STRIP: 7 [PH] (ref 5–8)
PROT UR QL STRIP: ABNORMAL
SP GR UR STRIP: 1.01 (ref 1–1.03)
URN SPEC COLLECT METH UR: ABNORMAL
UROBILINOGEN UR STRIP-ACNC: 1 EU/DL

## 2023-09-05 PROCEDURE — 87086 URINE CULTURE/COLONY COUNT: CPT

## 2023-09-05 PROCEDURE — 81000 URINALYSIS NONAUTO W/SCOPE: CPT

## 2023-09-05 PROCEDURE — 81003 URINALYSIS AUTO W/O SCOPE: CPT

## 2023-09-06 LAB
BACTERIA #/AREA URNS HPF: NORMAL /HPF
HYALINE CASTS #/AREA URNS LPF: 1 /LPF
MICROSCOPIC COMMENT: NORMAL
RBC #/AREA URNS HPF: 0 /HPF (ref 0–4)
SQUAMOUS #/AREA URNS HPF: 2 /HPF
WBC #/AREA URNS HPF: 1 /HPF (ref 0–5)

## 2023-09-07 LAB
BACTERIA UR CULT: NORMAL
BACTERIA UR CULT: NORMAL

## 2024-09-01 ENCOUNTER — HOSPITAL ENCOUNTER (EMERGENCY)
Facility: HOSPITAL | Age: 74
Discharge: HOME | End: 2024-09-01
Attending: EMERGENCY MEDICINE
Payer: MEDICARE

## 2024-09-01 VITALS
SYSTOLIC BLOOD PRESSURE: 209 MMHG | HEART RATE: 69 BPM | RESPIRATION RATE: 18 BRPM | DIASTOLIC BLOOD PRESSURE: 109 MMHG | WEIGHT: 180 LBS | OXYGEN SATURATION: 96 % | HEIGHT: 65 IN | BODY MASS INDEX: 29.99 KG/M2 | TEMPERATURE: 98 F

## 2024-09-01 DIAGNOSIS — S61.215A LACERATION OF LEFT RING FINGER WITHOUT FOREIGN BODY WITHOUT DAMAGE TO NAIL, INITIAL ENCOUNTER: Primary | ICD-10-CM

## 2024-09-01 PROCEDURE — 99282 EMERGENCY DEPT VISIT SF MDM: CPT

## 2024-09-01 PROCEDURE — 12001 RPR S/N/AX/GEN/TRNK 2.5CM/<: CPT

## 2024-09-01 NOTE — DISCHARGE INSTRUCTIONS
Keep affected area clean and dry for 2 days then begin washing twice daily with antibacterial soap and water.  Allow Steri-Strips to fall off without peeling them yourself.  See your doctor in 1 week and return to the emergency department for worsening condition.

## 2024-09-01 NOTE — ED PROVIDER NOTES
"Encounter Date: 9/1/2024       History     Chief Complaint   Patient presents with    Laceration     Patient to the ER with a laceration to distal left ring finger onset just pta. "I was  meat patties and the knife slipped." Tetanus booster up to date.     This is a 74-year-old white female with noncontributory past medical history who presents to the emergency department with complaints of laceration to left ring finger that occurred just prior to arrival.  Patient reports accidentally cutting herself with a kitchen knife while preparing burgers.  Bleeding controlled prior to arrival.  No difficulty with movement.      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 W/O TX @ PRESENT    Hypertension     PAST H/O , NO TX NOW    Melena     Rash     LOWER BACK    UTI (urinary tract infection)     Wears dentures     FULL    Wears glasses      Past Surgical History:   Procedure Laterality Date    CARPAL TUNNEL RELEASE Bilateral     CHOLECYSTECTOMY      COLONOSCOPY      COLONOSCOPY N/A 11/28/2017    Procedure: COLONOSCOPY;  Surgeon: Rui June MD;  Location: [facility];  Service: Endoscopy;  Laterality: N/A;    COLONOSCOPY N/A 7/27/2022    Procedure: COLONOSCOPY;  Surgeon: Fariha Choi MD;  Location: [facility];  Service: General;  Laterality: N/A;  4th 0700    ESOPHAGOGASTRODUODENOSCOPY      HYSTERECTOMY      ORIF WRIST FRACTURE Right      Family History   Problem Relation Name Age of Onset    Diabetes Mother  89    Heart disease Father  69    Suicide Sister  62    Pancreatic cancer Brother  70 "     Social History     Tobacco Use    Smoking status: Never    Smokeless tobacco: Never   Substance Use Topics    Alcohol use: No    Drug use: No     Review of Systems   Skin:  Positive for wound.   Neurological:  Negative for numbness.       Physical Exam     Initial Vitals [09/01/24 1217]   BP Pulse Resp Temp SpO2   (!) 209/109 69 18 97.9 °F (36.6 °C) 96 %      MAP       --         Physical Exam    Nursing note and vitals reviewed.  Constitutional: She appears well-developed and well-nourished. She is active. No distress.   HENT:   Head: Normocephalic and atraumatic.   Eyes: EOM are normal. Pupils are equal, round, and reactive to light.   Neck: Neck supple.   Normal range of motion.  Cardiovascular:  Normal rate.           Pulmonary/Chest: No respiratory distress.   Musculoskeletal:         General: Normal range of motion.        Hands:       Cervical back: Normal range of motion and neck supple.     Neurological: She is alert and oriented to person, place, and time. GCS eye subscore is 4. GCS verbal subscore is 5. GCS motor subscore is 6.   Skin: Skin is warm and dry. Capillary refill takes less than 2 seconds.   Psychiatric: She has a normal mood and affect. Her behavior is normal. Thought content normal.         ED Course   Lac Repair    Date/Time: 9/1/2024 12:46 PM    Performed by: Yamilet Ferguson NP  Authorized by: Keith Manning MD    Consent:     Consent obtained:  Verbal    Consent given by:  Patient    Risks discussed:  Infection, need for additional repair and poor cosmetic result  Universal protocol:     Patient identity confirmed:  Verbally with patient  Anesthesia:     Anesthesia method:  None  Laceration details:     Location:  Finger    Finger location:  L ring finger    Length (cm):  1.5  Pre-procedure details:     Preparation:  Patient was prepped and draped in usual sterile fashion  Exploration:     Hemostasis achieved with:  Direct pressure    Wound extent: no tendon damage noted       Contaminated: no    Treatment:     Area cleansed with:  Saline    Amount of cleaning:  Standard    Irrigation solution:  Sterile saline    Irrigation volume:  200    Irrigation method:  Syringe  Skin repair:     Repair method:  Steri-Strips and tissue adhesive    Number of Steri-Strips:  3  Approximation:     Approximation:  Close  Repair type:     Repair type:  Simple  Post-procedure details:     Dressing:  Non-adherent dressing    Procedure completion:  Tolerated well, no immediate complications    Labs Reviewed - No data to display       Imaging Results    None          Medications - No data to display  Medical Decision Making                                    Clinical Impression:  Final diagnoses:  [S61.215A] Laceration of left ring finger without foreign body without damage to nail, initial encounter (Primary)          ED Disposition Condition    Discharge Stable          ED Prescriptions    None       Follow-up Information       Follow up With Specialties Details Why Contact Info    PCP Follow UP  Call in 1 week for follow-up, for re-evaluation of today's complaint              Yamilet Ferguson NP  09/01/24 [id number]

## 2025-01-17 ENCOUNTER — OFFICE VISIT (OUTPATIENT)
Dept: PRIMARY CARE CLINIC | Facility: CLINIC | Age: 75
End: 2025-01-17
Payer: MEDICARE

## 2025-01-17 VITALS
WEIGHT: 187 LBS | OXYGEN SATURATION: 96 % | BODY MASS INDEX: 31.16 KG/M2 | HEIGHT: 65 IN | HEART RATE: 56 BPM | DIASTOLIC BLOOD PRESSURE: 72 MMHG | TEMPERATURE: 98 F | SYSTOLIC BLOOD PRESSURE: 160 MMHG

## 2025-01-17 DIAGNOSIS — Z76.89 ENCOUNTER TO ESTABLISH CARE: Primary | ICD-10-CM

## 2025-01-17 DIAGNOSIS — Z97.3 WEARS GLASSES: ICD-10-CM

## 2025-01-17 DIAGNOSIS — Z85.43 HISTORY OF OVARIAN CANCER: ICD-10-CM

## 2025-01-17 DIAGNOSIS — Z13.21 ENCOUNTER FOR VITAMIN DEFICIENCY SCREENING: ICD-10-CM

## 2025-01-17 DIAGNOSIS — E78.00 HIGH CHOLESTEROL: ICD-10-CM

## 2025-01-17 DIAGNOSIS — Z97.2 WEARS DENTURES: ICD-10-CM

## 2025-01-17 DIAGNOSIS — Z78.0 POST-MENOPAUSAL: ICD-10-CM

## 2025-01-17 DIAGNOSIS — Z13.1 SCREENING FOR DIABETES MELLITUS (DM): ICD-10-CM

## 2025-01-17 DIAGNOSIS — Z13.820 OSTEOPOROSIS SCREENING: ICD-10-CM

## 2025-01-17 DIAGNOSIS — N28.9 RENAL INSUFFICIENCY: Primary | ICD-10-CM

## 2025-01-17 DIAGNOSIS — K21.9 GASTROESOPHAGEAL REFLUX DISEASE, UNSPECIFIED WHETHER ESOPHAGITIS PRESENT: ICD-10-CM

## 2025-01-17 DIAGNOSIS — Z11.59 ENCOUNTER FOR HEPATITIS C SCREENING TEST FOR LOW RISK PATIENT: ICD-10-CM

## 2025-01-17 LAB
ALBUMIN SERPL BCP-MCNC: 3.8 G/DL (ref 3.5–5.2)
ALBUMIN/CREAT UR: 68.2 UG/MG (ref 0–30)
ALP SERPL-CCNC: 50 U/L (ref 55–135)
ALT SERPL W/O P-5'-P-CCNC: 13 U/L (ref 10–44)
ANION GAP SERPL CALC-SCNC: 9 MMOL/L (ref 8–16)
AST SERPL-CCNC: 19 U/L (ref 10–40)
BILIRUB SERPL-MCNC: 0.5 MG/DL (ref 0.1–1)
BUN SERPL-MCNC: 22 MG/DL (ref 8–23)
CALCIUM SERPL-MCNC: 9.1 MG/DL (ref 8.7–10.5)
CHLORIDE SERPL-SCNC: 109 MMOL/L (ref 95–110)
CHOLEST SERPL-MCNC: 143 MG/DL (ref 120–199)
CHOLEST/HDLC SERPL: 2.8 {RATIO} (ref 2–5)
CO2 SERPL-SCNC: 26 MMOL/L (ref 23–29)
CREAT SERPL-MCNC: 1.2 MG/DL (ref 0.5–1.4)
CREAT UR-MCNC: 130.5 MG/DL (ref 15–325)
EST. GFR  (NO RACE VARIABLE): 46.9 ML/MIN/1.73 M^2
GLUCOSE SERPL-MCNC: 86 MG/DL (ref 70–110)
HCV AB SERPL QL IA: NORMAL
HDLC SERPL-MCNC: 52 MG/DL (ref 40–75)
HDLC SERPL: 36.4 % (ref 20–50)
LDLC SERPL CALC-MCNC: 69.2 MG/DL (ref 63–159)
MICROALBUMIN UR DL<=1MG/L-MCNC: 89 UG/ML
NONHDLC SERPL-MCNC: 91 MG/DL
POTASSIUM SERPL-SCNC: 3.8 MMOL/L (ref 3.5–5.1)
PROT SERPL-MCNC: 6.7 G/DL (ref 6–8.4)
SODIUM SERPL-SCNC: 144 MMOL/L (ref 136–145)
TRIGL SERPL-MCNC: 109 MG/DL (ref 30–150)

## 2025-01-17 PROCEDURE — 86803 HEPATITIS C AB TEST: CPT | Performed by: NURSE PRACTITIONER

## 2025-01-17 PROCEDURE — 80061 LIPID PANEL: CPT | Performed by: NURSE PRACTITIONER

## 2025-01-17 PROCEDURE — 36415 COLL VENOUS BLD VENIPUNCTURE: CPT | Performed by: NURSE PRACTITIONER

## 2025-01-17 PROCEDURE — 99999 PR PBB SHADOW E&M-EST. PATIENT-LVL III: CPT | Mod: PBBFAC,,, | Performed by: NURSE PRACTITIONER

## 2025-01-17 PROCEDURE — 80053 COMPREHEN METABOLIC PANEL: CPT | Performed by: NURSE PRACTITIONER

## 2025-01-17 PROCEDURE — 82570 ASSAY OF URINE CREATININE: CPT | Performed by: NURSE PRACTITIONER

## 2025-01-17 PROCEDURE — 1160F RVW MEDS BY RX/DR IN RCRD: CPT | Mod: CPTII,S$GLB,, | Performed by: NURSE PRACTITIONER

## 2025-01-17 PROCEDURE — 36415 COLL VENOUS BLD VENIPUNCTURE: CPT | Mod: S$GLB,,, | Performed by: NURSE PRACTITIONER

## 2025-01-17 PROCEDURE — 1126F AMNT PAIN NOTED NONE PRSNT: CPT | Mod: CPTII,S$GLB,, | Performed by: NURSE PRACTITIONER

## 2025-01-17 PROCEDURE — 3078F DIAST BP <80 MM HG: CPT | Mod: CPTII,S$GLB,, | Performed by: NURSE PRACTITIONER

## 2025-01-17 PROCEDURE — 3077F SYST BP >= 140 MM HG: CPT | Mod: CPTII,S$GLB,, | Performed by: NURSE PRACTITIONER

## 2025-01-17 PROCEDURE — 82306 VITAMIN D 25 HYDROXY: CPT | Performed by: NURSE PRACTITIONER

## 2025-01-17 PROCEDURE — 1159F MED LIST DOCD IN RCRD: CPT | Mod: CPTII,S$GLB,, | Performed by: NURSE PRACTITIONER

## 2025-01-17 PROCEDURE — 99204 OFFICE O/P NEW MOD 45 MIN: CPT | Mod: S$GLB,,, | Performed by: NURSE PRACTITIONER

## 2025-01-17 RX ORDER — ROSUVASTATIN CALCIUM 40 MG/1
40 TABLET, COATED ORAL
COMMUNITY
Start: 2024-12-26

## 2025-01-17 RX ORDER — ESCITALOPRAM OXALATE 5 MG/1
5 TABLET ORAL DAILY
COMMUNITY

## 2025-01-17 NOTE — PROGRESS NOTES
[facility] Note    HPI:  Mehrdad Echevarria is a 74 y.o. female who presents today for [facility] Care (Establish care)         Review of Systems   Constitutional: Negative.    HENT: Negative.     Eyes: Negative.    Respiratory: Negative.     Cardiovascular: Negative.    Gastrointestinal: Negative.    Genitourinary: Negative.    Musculoskeletal: Negative.    Skin: Negative.    Neurological: Negative.    Endo/Heme/Allergies: Negative.    Psychiatric/Behavioral: Negative.        A review of systems was performed and was negative except as noted above.    I personally reviewed allergies, past medical, surgical, social and family history and updated as appropriate.    Medications:    Current Outpatient Medications:     aspirin (ECOTRIN) 81 MG EC tablet, Take 81 mg by mouth every evening. OK TO CONTINUE PER DR. OSULLIVAN, Disp: , Rfl:     cholecalciferol, vitamin D3, (VITAMIN D3) 25 mcg (1,000 unit) capsule, Take 1,000 Units by mouth every evening., Disp: , Rfl:     EScitalopram oxalate (LEXAPRO) 5 MG Tab, Take 5 mg by mouth once daily., Disp: , Rfl:     omeprazole (PRILOSEC) 40 MG capsule, Take 40 mg by mouth every morning., Disp: , Rfl:     rosuvastatin (CRESTOR) 40 MG Tab, Take 40 mg by mouth., Disp: , Rfl:      Health Maintenance:  Immunization History   Administered Date(s) Administered    Tdap 07/11/2024      Health Maintenance   Topic Date Due    Hepatitis C Screening  Never done    Lipid Panel  Never done    DEXA Scan  Never done    RSV Vaccine (Age 60+ and Pregnant patients) (1 - 1-dose 75+ series) 01/17/2025 (Originally 2/19/2023)    Shingles Vaccine (1 of 2) 01/17/2026 (Originally 2/19/1998)    COVID-19 Vaccine (1 - 2024-25 season) 01/17/2026 (Originally 9/1/2024)    Pneumococcal Vaccines (Age 50+) (1 of 1 - PCV) 01/17/2026 (Originally 2/19/1998)    TETANUS VACCINE  07/11/2034    Influenza Vaccine  Discontinued     Health Maintenance Topics with due status: Not Due       Topic Last Completion Date  "   TETANUS VACCINE 07/11/2024     Health Maintenance Due   Topic Date Due    Hepatitis C Screening  Never done    Lipid Panel  Never done    DEXA Scan  Never done       PHYSICAL EXAM:  Vitals:    01/17/25 0916 01/17/25 0922   BP: (!) 159/71 (!) 160/72   Patient Position: Sitting    Pulse: (!) 56    Temp: 98.4 °F (36.9 °C)    SpO2: 96%    Weight: 84.8 kg (187 lb)    Height: 5' 5" (1.651 m)      Body mass index is 31.12 kg/m².  Physical Exam  Constitutional:       Appearance: Normal appearance. She is normal weight.   HENT:      Head: Normocephalic.      Right Ear: Tympanic membrane, ear canal and external ear normal.      Left Ear: Tympanic membrane, ear canal and external ear normal.      Nose: Nose normal.      Mouth/Throat:      Mouth: Mucous membranes are moist.   Cardiovascular:      Rate and Rhythm: Normal rate and regular rhythm.      Pulses: Normal pulses.      Heart sounds: Normal heart sounds.   Pulmonary:      Effort: Pulmonary effort is normal.      Breath sounds: Normal breath sounds.   Musculoskeletal:         General: Normal range of motion.      Cervical back: Normal range of motion.   Skin:     General: Skin is warm and dry.   Neurological:      General: No focal deficit present.      Mental Status: She is alert and oriented to person, place, and time.          ASSESSMENT/PLAN:  1. Encounter to establish care    2. High cholesterol  -     Lipid Panel; Future; Expected date: 01/17/2025    3. Encounter for hepatitis C screening test for low risk patient  -     Hepatitis C Antibody; Future; Expected date: 01/17/2025    4. Screening for diabetes mellitus (DM)  -     Comprehensive Metabolic Panel; Future; Expected date: 01/17/2025  -     Microalbumin/Creatinine Ratio, Urine; Future; Expected date: 01/17/2025    5. Encounter for vitamin deficiency screening  -     Misc Sendout Test, Blood Vitamin D; Future; Expected date: 01/17/2025    6. Osteoporosis screening  -     DXA Bone Density Axial Skeleton 1 or " more sites; Future; Expected date: 01/17/2025    7. Post-menopausal  -     DXA Bone Density Axial Skeleton 1 or more sites; Future; Expected date: 01/17/2025    8. Wears glasses    9. Wears dentures  Overview:  FULL      10. History of ovarian cancer    11. Gastroesophageal reflux disease, unspecified whether esophagitis present        Other than changes above, continue current medications and maintain follow up with specialists.      No follow-ups on file.   No results found for this or any previous visit (from the past 12 weeks).      CHIP Zimmerman  [facility]

## 2025-01-18 LAB — 25(OH)D3+25(OH)D2 SERPL-MCNC: 56 NG/ML (ref 30–96)

## 2025-05-05 ENCOUNTER — TELEPHONE (OUTPATIENT)
Dept: PRIMARY CARE CLINIC | Facility: CLINIC | Age: 75
End: 2025-05-05
Payer: MEDICARE

## 2025-05-05 DIAGNOSIS — E78.00 HIGH CHOLESTEROL: Primary | ICD-10-CM

## 2025-05-05 RX ORDER — ROSUVASTATIN CALCIUM 40 MG/1
40 TABLET, COATED ORAL DAILY
Qty: 90 TABLET | Refills: 3 | Status: SHIPPED | OUTPATIENT
Start: 2025-05-05

## (undated) DEVICE — SOL IRRI STRL WATER 1000ML

## (undated) DEVICE — KIT BIOGUARD AIR WTR SUC VALVE

## (undated) DEVICE — GOWN SURGICAL BRTHBL XL

## (undated) DEVICE — TUBING OXYGEN CONNECT BUBBLE

## (undated) DEVICE — UNDERPAD DISPOSABLE 30X30IN

## (undated) DEVICE — TIP YANKAUERS BULB NO VENT

## (undated) DEVICE — ELECTRODE FOAM 535 TEARDROP

## (undated) DEVICE — SPONGE DRY VIA GREEN

## (undated) DEVICE — KIT VIA CUSTOM PROCEDURE

## (undated) DEVICE — TUBE SUC UNIVERSAL .25XIN 6FT

## (undated) DEVICE — CANNULA SUPERSOFT CO2 M AD 7FT

## (undated) DEVICE — CONNECTOR TORRENT SCP OLYMPUS

## (undated) DEVICE — LINER SUCTION CANNISTER REGUGA

## (undated) DEVICE — SYR SLIP TIP 60 CC DISP